# Patient Record
Sex: FEMALE | Race: WHITE | NOT HISPANIC OR LATINO | URBAN - METROPOLITAN AREA
[De-identification: names, ages, dates, MRNs, and addresses within clinical notes are randomized per-mention and may not be internally consistent; named-entity substitution may affect disease eponyms.]

---

## 2018-06-27 ENCOUNTER — OFFICE VISIT (OUTPATIENT)
Dept: URBAN - METROPOLITAN AREA CLINIC 23 | Facility: CLINIC | Age: 78
End: 2018-06-27
Payer: COMMERCIAL

## 2018-06-27 DIAGNOSIS — H43.813 VITREOUS DEGENERATION, BILATERAL: Primary | ICD-10-CM

## 2018-06-27 PROCEDURE — 92012 INTRM OPH EXAM EST PATIENT: CPT | Performed by: OPTOMETRIST

## 2018-06-27 ASSESSMENT — INTRAOCULAR PRESSURE
OS: 14
OD: 14

## 2018-06-27 NOTE — IMPRESSION/PLAN
Impression: Vitreous degeneration, bilateral: H43.813. Plan: Discussed findings. Recommend monitoring.

## 2019-01-09 ENCOUNTER — OFFICE VISIT (OUTPATIENT)
Dept: URBAN - METROPOLITAN AREA CLINIC 23 | Facility: CLINIC | Age: 79
End: 2019-01-09
Payer: MEDICARE

## 2019-01-09 ENCOUNTER — Encounter (OUTPATIENT)
Dept: URBAN - METROPOLITAN AREA SURGERY 10 | Facility: SURGERY | Age: 79
End: 2019-01-09
Payer: COMMERCIAL

## 2019-01-09 DIAGNOSIS — H33.012 RETINAL DETACHMENT WITH SINGLE BREAK, LEFT EYE: Primary | ICD-10-CM

## 2019-01-09 PROCEDURE — 92014 COMPRE OPH EXAM EST PT 1/>: CPT | Performed by: OPHTHALMOLOGY

## 2019-01-09 PROCEDURE — 92134 CPTRZ OPH DX IMG PST SGM RTA: CPT | Performed by: OPHTHALMOLOGY

## 2019-01-09 PROCEDURE — 92014 COMPRE OPH EXAM EST PT 1/>: CPT | Performed by: OPTOMETRIST

## 2019-01-09 RX ORDER — PREDNISOLONE ACETATE 10 MG/ML
1 % SUSPENSION/ DROPS OPHTHALMIC
Qty: 10 | Refills: 5 | Status: INACTIVE
Start: 2019-01-09 | End: 2021-11-23

## 2019-01-09 RX ORDER — OFLOXACIN 3 MG/ML
0.3 % SOLUTION/ DROPS OPHTHALMIC
Qty: 5 | Refills: 3 | Status: INACTIVE
Start: 2019-01-09 | End: 2021-11-23

## 2019-01-09 ASSESSMENT — INTRAOCULAR PRESSURE
OD: 12
OD: 14
OS: 14
OD: 14
OS: 14
OD: 12
OS: 14
OD: 14
OD: 12

## 2019-01-09 NOTE — IMPRESSION/PLAN
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 OS. Condition: unstable. Vision: vision affected. Plan: Discussed diagnosis in detail with patient. Discussed risks of progression. Surgical treatment is recommended to repair the retina PPVx. Surgical risks and benefits were discussed, explained and understood by patient. Unable to tell how much vision will be recovered. Discussed gas bubble and post-op care: no traveling, flying or high altitude for approximately 6 - 8 weeks. All questions answered. Patient elects to proceed with recommendation. RL1. Educational material provided to patient. Drops Erx to pharmacy on file. Schedule Urgent Surgery Today. OCT OS shows mac off RD. Pt has rxn to Levofloxacin (tendons hurt). Per Dr Yon PETERSON to use Ofloxacin, has used in the past also.

## 2019-01-10 ENCOUNTER — POST-OPERATIVE VISIT (OUTPATIENT)
Dept: URBAN - METROPOLITAN AREA CLINIC 23 | Facility: CLINIC | Age: 79
End: 2019-01-10

## 2019-01-21 ENCOUNTER — POST-OPERATIVE VISIT (OUTPATIENT)
Dept: URBAN - METROPOLITAN AREA CLINIC 23 | Facility: CLINIC | Age: 79
End: 2019-01-21

## 2019-01-21 PROCEDURE — 99024 POSTOP FOLLOW-UP VISIT: CPT | Performed by: OPHTHALMOLOGY

## 2019-01-21 ASSESSMENT — INTRAOCULAR PRESSURE
OS: 18
OD: 14

## 2019-02-28 ENCOUNTER — POST-OPERATIVE VISIT (OUTPATIENT)
Dept: URBAN - METROPOLITAN AREA CLINIC 23 | Facility: CLINIC | Age: 79
End: 2019-02-28

## 2019-02-28 DIAGNOSIS — Z09 ENCNTR FOR F/U EXAM AFT TRTMT FOR COND OTH THAN MALIG NEOPLM: Primary | ICD-10-CM

## 2019-02-28 PROCEDURE — 99024 POSTOP FOLLOW-UP VISIT: CPT | Performed by: OPHTHALMOLOGY

## 2019-02-28 ASSESSMENT — INTRAOCULAR PRESSURE
OD: 15
OS: 14

## 2020-09-25 ENCOUNTER — OFFICE VISIT (OUTPATIENT)
Dept: URBAN - METROPOLITAN AREA CLINIC 23 | Facility: CLINIC | Age: 80
End: 2020-09-25
Payer: COMMERCIAL

## 2020-09-25 PROCEDURE — 92014 COMPRE OPH EXAM EST PT 1/>: CPT | Performed by: OPTOMETRIST

## 2020-09-25 ASSESSMENT — KERATOMETRY
OD: 43.00
OS: 42.38

## 2020-09-25 ASSESSMENT — INTRAOCULAR PRESSURE
OD: 17
OS: 18

## 2020-09-25 NOTE — IMPRESSION/PLAN
Impression: Other chorioretinal scars, left eye: H31.092. Plan: Discussed findings. RD well repaired. Addressed black floater with central yellow.

## 2020-12-29 NOTE — IMPRESSION/PLAN
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 Plan: Discussed diagnosis in detail with patient. Discussed treatment options with patient. Discussed risks and benefits and patient understands. Advised patient of condition. Reassured patient of current condition and treatment. Recommend retina consult today for RD with single break OS. Discussed possibility of surgical procedure today or tomorrow at Dr. Dickey Sham discretion.  Patient advised to remain NPO, last meal was this morning at 9am.
Yes

## 2021-11-23 ENCOUNTER — OFFICE VISIT (OUTPATIENT)
Dept: URBAN - METROPOLITAN AREA CLINIC 23 | Facility: CLINIC | Age: 81
End: 2021-11-23
Payer: COMMERCIAL

## 2021-11-23 DIAGNOSIS — H31.092 OTHER CHORIORETINAL SCARS, LEFT EYE: Primary | ICD-10-CM

## 2021-11-23 DIAGNOSIS — H26.491 OTHER SECONDARY CATARACT, RIGHT EYE: ICD-10-CM

## 2021-11-23 PROCEDURE — 99213 OFFICE O/P EST LOW 20 MIN: CPT | Performed by: OPTOMETRIST

## 2021-11-23 ASSESSMENT — INTRAOCULAR PRESSURE
OS: 10
OD: 10

## 2021-11-23 ASSESSMENT — KERATOMETRY
OD: 43.25
OS: 42.88

## 2021-11-23 NOTE — IMPRESSION/PLAN
Impression: Other chorioretinal scars, left eye: H31.092 Left. Condition: established, stable. Plan: Discussed findings. Stable, no treatment necessary. Continue to monitor yearly.

## 2021-11-23 NOTE — IMPRESSION/PLAN
Impression: Other secondary cataract, right eye: H26.491 Right. Condition: established, worsening. Plan: Discussed findings. No treatment necessary at this time, patient to call with any vision changes.